# Patient Record
Sex: FEMALE | Race: WHITE | Employment: STUDENT | URBAN - METROPOLITAN AREA
[De-identification: names, ages, dates, MRNs, and addresses within clinical notes are randomized per-mention and may not be internally consistent; named-entity substitution may affect disease eponyms.]

---

## 2017-10-13 LAB
ABO + RH BLD: NORMAL
ABO + RH BLD: NORMAL
HBV SURFACE AG SERPL QL IA: NEGATIVE
HIV 1+2 AB+HIV1 P24 AG SERPL QL IA: NEGATIVE
RUBELLA ANTIBODY IGG QUANTITATIVE: NORMAL IU/ML
T PALLIDUM IGG SER QL: NEGATIVE

## 2017-11-13 ENCOUNTER — TRANSFERRED RECORDS (OUTPATIENT)
Dept: HEALTH INFORMATION MANAGEMENT | Facility: CLINIC | Age: 37
End: 2017-11-13

## 2017-12-21 ENCOUNTER — PRE VISIT (OUTPATIENT)
Dept: MATERNAL FETAL MEDICINE | Facility: CLINIC | Age: 37
End: 2017-12-21

## 2018-01-04 ENCOUNTER — OFFICE VISIT (OUTPATIENT)
Dept: MATERNAL FETAL MEDICINE | Facility: CLINIC | Age: 38
End: 2018-01-04
Attending: OBSTETRICS & GYNECOLOGY
Payer: COMMERCIAL

## 2018-01-04 ENCOUNTER — HOSPITAL ENCOUNTER (OUTPATIENT)
Dept: ULTRASOUND IMAGING | Facility: CLINIC | Age: 38
Discharge: HOME | End: 2018-01-04
Attending: OBSTETRICS & GYNECOLOGY | Admitting: OBSTETRICS & GYNECOLOGY
Payer: COMMERCIAL

## 2018-01-04 DIAGNOSIS — O26.90 PREGNANCY RELATED CONDITION, ANTEPARTUM: ICD-10-CM

## 2018-01-04 DIAGNOSIS — O09.522 ELDERLY MULTIGRAVIDA, CURRENTLY PREGNANT, SECOND TRIMESTER: Primary | ICD-10-CM

## 2018-01-04 DIAGNOSIS — O09.522 ELDERLY MULTIGRAVIDA IN SECOND TRIMESTER: Primary | ICD-10-CM

## 2018-01-04 PROCEDURE — 76811 OB US DETAILED SNGL FETUS: CPT

## 2018-01-04 PROCEDURE — 96040 ZZH GENETIC COUNSELING, EACH 30 MINUTES: CPT | Mod: ZF | Performed by: GENETIC COUNSELOR, MS

## 2018-01-04 NOTE — PROGRESS NOTES
[facility] Fetal [facility] [place]  Genetic Counseling Consult    Patient:  Nohemy Oconnor YOB: 1980   Date of Service:  18      Nohemy Oconnor was seen at the [facility] [place] for genetic consultation as part of her appointment for comprehensive ultrasound.  The indication for genetic counseling is advanced maternal age.       Impression/Plan:   1. Nohemy has declined serum screening in this pregnancy.    2. Nohemy had a comprehensive (level II) ultrasound today.  Please see the ultrasound report for further details.    3. The patient declines genetic amniocentesis and maternal serum screening today.    Pregnancy History:   /Parity:    Age at Delivery: 37 year old  GULSHAN: 2018, by Last Menstrual Period  Gestational Age: 20w1d    No significant complications or exposures were reported in the current pregnancy.    Nohemy cox pregnancy history is significant for a healthy son and daughter.    Medical History:   Nohemy has a history of a DVT while on OCPs.  She is taking Lovenox in this pregnancy.         Family History:   A three-generation pedigree was obtained, and is scanned under the  Media  tab.   The following significant findings were reported by Nohemy:    Nohemy has a personal and family history of DVT.  She had a DVT while on OCPs, her father had a DVT following surgery and her sister had a postpartum DVT.  She has met with a hematologist through [facility] and had a negative inherited thrombophilia work-up. We did discuss the potential option of further assessment through the Bleeding and Clotting clinic at the [facility].  At this time, Nohemy is not interested in this option.      Nohemy's , Blade, has a 13YO nephew through his brother with DiGeorge syndrome.  We discussed that DiGeorge syndrome is autosomal dominant and caused by a deletion of 22q11. Approximately 90% of  affected individuals have a de nicolasa or new deletion and 10% inherit a deletion from a parent.  They do not believe his parents have had genetic testing. Blade reports no additional family history of clinical features associated with DiGeorge including cardiac defects, clefting, learning disabilities, immune deficiency or hypocalcemia. They did indicate their nephew's mother (unrelated to Blade) was born with a hole in her heart.   I explained that features in affected individuals can be subtle and vary, even within the same family. WE discussed that the risk is likely low for their children to have DiGeorge syndrome, but without more definitive information in the family, it is not possible to completely eliminate risk.      Otherwise, the reported family history is negative for multiple miscarriages, stillbirths, birth defects, mental retardation, known genetic conditions, and consanguinity.       Carrier Screening:   The patient reports that she and the father of the pregnancy have  ancestry:      Cystic fibrosis is an autosomal recessive genetic condition that occurs with increased frequency in individuals of  ancestry and carrier screening for this condition is available.  In addition,  screening in the [facility] includes cystic fibrosis.      Expanded carrier screening for mutations in a large panel of genes associated with autosomal recessive conditions including cystic fibrosis, spinal muscular atrophy, and others, is now available.      The patient has declined the carrier screening options reviewed today.       Risk Assessment for Chromosome Conditions:   We explained that the risk for fetal chromosome abnormalities increases with maternal age. We discussed specific features of common chromosome abnormalities, including Down syndrome, trisomy 13, trisomy 18, and sex chromosome trisomies.      - At age 37 at midtrimester, the risk to have a baby with Down syndrome is 1 in 216.      - At age 37 at midtrimester, the risk to have a baby with any chromosome abnormality is 1 in 105.       Nohemy did not have maternal serum screening earlier in pregnancy.       Testing Options:   We discussed the following options:   Non-invasive Prenatal Testing (NIPT)    Maternal plasma cell-free DNA testing; first trimester ultrasound with nuchal translucency and nasal bone assessment is recommended, when appropriate    Screens for fetal trisomy 21, trisomy 13, trisomy 18, and sex chromosome aneuploidy    Cannot screen for open neural tube defects; maternal serum AFP after 15 weeks is recommended     Genetic Amniocentesis    Invasive procedure typically performed in the second trimester by which amniotic fluid is obtained for the purpose of chromosome analysis and/or other prenatal genetic analysis    Diagnostic results; >99% sensitivity for fetal chromosome abnormalities    AFAFP measurement tests for open neural tube defects     Comprehensive (Level II) ultrasound: Detailed ultrasound performed between 18-22 weeks gestation to screen for major birth defects and markers for aneuploidy.      We reviewed the benefits and limitations of this testing.  Screening tests provide a risk assessment specific to the pregnancy for certain fetal chromosome abnormalities, but cannot definitively diagnose or exclude a fetal chromosome abnormality.  Follow-up genetic counseling and consideration of diagnostic testing is recommended with any abnormal screening result.     Diagnostic tests carry inherent risks- including risk of miscarriage- that require careful consideration.  These tests can detect fetal chromosome abnormalities with greater than 99% certainty.  Results can be compromised by maternal cell contamination or mosaicism, and are limited by the resolution of cytogenetic G-banding technology.  There is no screening nor diagnostic test that can detect all forms of birth defects or mental disability.    It was a  pleasure to be involved with Nohemy s care. Face-to-face time of the meeting was 30 minutes.      Carley Locke, [facility]  Certified Genetic Counselor  VM: [phone]

## 2018-01-04 NOTE — MR AVS SNAPSHOT
"              After Visit Summary   2018    Nohemy Oconnor    MRN: [id number]           Patient Information     Date Of Birth          1980        Visit Information        Provider Department      2018 11:00 AM Carley Locke [facility] Maternal Fetal Medicine [facility]        Today's Diagnoses     Elderly multigravida, currently pregnant, second trimester    -  1    Pregnancy related condition, antepartum           Follow-ups after your visit        Who to contact     If you have questions or need follow up information about today's clinic visit or your schedule please contact [facility] MATERNAL FETAL MEDIC[facility] directly at [phone].  Normal or non-critical lab and imaging results will be communicated to you by [employer], letter or phone within 4 business days after the clinic has received the results. If you do not hear from us within 7 days, please contact the clinic through [employer] or phone. If you have a critical or abnormal lab result, we will notify you by phone as soon as possible.  Submit refill requests through Rezzcard or call your pharmacy and they will forward the refill request to us. Please allow 3 business days for your refill to be completed.          Additional Information About Your Visit        Ha[employer] Information     Rezzcard lets you send messages to your doctor, view your test results, renew your prescriptions, schedule appointments and more. To sign up, go to [email or web address] . Click on \"Log in\" on the left side of the screen, which will take you to the Welcome page. Then click on \"Sign up Now\" on the right side of the page.     You will be asked to enter the access code listed below, as well as some personal information. Please follow the directions to create your username and password.     Your access code is: [email or web address]  Expires: 2018  2:37 PM     Your access code will  in 90 days. If you need help or a new code, please call your " [facility] or [phone].        Care EveryWhere ID     This is your Care EveryWhere ID. This could be used by other organizations to access your [facility] medical records  [id number]        Your Vitals Were     Last Period                   08/16/2017            Blood Pressure from Last 3 Encounters:   01/08/14 118/74   01/01/14 122/80    Weight from Last 3 Encounters:   01/06/14 83.5 kg (184 lb)              We Performed the Following     [facility] Genetic Counseling        Primary Care Provider Office Phone # Fax #    Melani Patino [phone]       [facility] [place] [address]  [facility] [address]        Equal Access to Services     DIANA CASAS : Hadii zuly ku hadasho Soomaali, waaxda luqadaha, qaybta kaalmada adeegyada, hayder romo . So [facility] [phone].    ATENCIÓN: Si habla español, tiene a monahan disposición servicios gratuitos de asistencia lingüística. Llame al [phone].    We comply with applicable federal civil rights laws and [place] laws. We do not discriminate on the basis of race, color, national origin, age, disability, sex, sexual orientation, or gender identity.            Thank you!     Thank you for choosing [facility]  for your care. Our goal is always to provide you with excellent care. Hearing back from our patients is one way we can continue to improve our services. Please take a few minutes to complete the written survey that you may receive in the mail after your visit with us. Thank you!             Your Updated Medication List - Protect others around you: Learn how to safely use, store and throw away your medicines at www.disposemymeds.org.          This list is accurate as of: 1/4/18  2:37 PM.  Always use your most recent med list.                   Brand Name Dispense Instructions for use Diagnosis    acetaminophen 325 MG tablet    TYLENOL    100 tablet    Take 2 tablets (650 mg) by mouth  every 4 hours as needed (fever greater than 102?F)    Indication for care in labor or delivery       oxyCODONE IR 5 MG tablet    ROXICODONE    20 tablet    Take 1-2 tablets (5-10 mg) by mouth every 3 hours as needed    Indication for care in labor or delivery       prenatal multivitamin plus iron 27-0.8 MG Tabs per tablet      Take 1 tablet by mouth daily        senna-docusate 8.6-50 MG per tablet    SENOKOT-S;PERICOLACE    7 tablet    Take 1-2 tablets by mouth 2 times daily    Indication for care in labor or delivery

## 2018-01-04 NOTE — MR AVS SNAPSHOT
"              After Visit Summary   2018    Nohemy Oconnor    MRN: [id number]           Patient Information     Date Of Birth          1980        Visit Information        Provider Department      2018 12:15 PM Nicole Mays, DO [facility]        Today's Diagnoses     Elderly multigravida in second trimester    -  1       Follow-ups after your visit        Who to contact     If you have questions or need follow up information about today's clinic visit or your schedule please contact [facility] MATERNAL FETAL MEDICINE [facility] directly at [phone].  Normal or non-critical lab and imaging results will be communicated to you by emoquohart, letter or phone within 4 business days after the clinic has received the results. If you do not hear from us within 7 days, please contact the clinic through [employer] or phone. If you have a critical or abnormal lab result, we will notify you by phone as soon as possible.  Submit refill requests through [employer] or call your pharmacy and they will forward the refill request to us. Please allow 3 business days for your refill to be completed.          Additional Information About Your Visit        MyChart Information     [employer] lets you send messages to your doctor, view your test results, renew your prescriptions, schedule appointments and more. To sign up, go to [email or web address] . Click on \"Log in\" on the left side of the screen, which will take you to the Welcome page. Then click on \"Sign up Now\" on the right side of the page.     You will be asked to enter the access code listed below, as well as some personal information. Please follow the directions to create your username and password.     Your access code is: [email or web address]  Expires: 2018  2:37 PM     Your access code will  in 90 days. If you need help or a new code, please call your [place] clinic or [phone].        Care EveryWhere ID     This " is your Care EveryWhere ID. This could be used by other organizations to access your [place] medical records  [id number]        Your Vitals Were     Last Period                   08/16/2017            Blood Pressure from Last 3 Encounters:   01/08/14 118/74   01/01/14 122/80    Weight from Last 3 Encounters:   01/06/14 83.5 kg (184 lb)              Today, you had the following     No orders found for display       Primary Care Provider Office Phone # Fax #    Melani Patino [phone]       [facility] [place] [address]  [facility] [address]        Equal Access to Services     [facility]: Hadii aad ku hadasho Soomaali, waaxda luqadaha, qaybta kaalmada adeegyada, hayder garcia hayaan mikie romo . So [facility] [phone].    ATENCIÓN: Si habla español, tiene a monahan disposición servicios gratuitos de asistencia lingüística. Ll[facility] [phone].    We comply with applicable federal civil rights laws and [place] laws. We do not discriminate on the basis of race, color, national origin, age, disability, sex, sexual orientation, or gender identity.            Thank you!     Thank you for choosing [facility]  for your care. Our goal is always to provide you with excellent care. Hearing back from our patients is one way we can continue to improve our services. Please take a few minutes to complete the written survey that you may receive in the mail after your visit with us. Thank you!             Your Updated Medication List - Protect others around you: Learn how to safely use, store and throw away your medicines at www.disposemymeds.org.          This list is accurate as of: 1/4/18 11:59 PM.  Always use your most recent med list.                   Brand Name Dispense Instructions for use Diagnosis    acetaminophen 325 MG tablet    TYLENOL    100 tablet    Take 2 tablets (650 mg) by mouth every 4 hours as needed (fever greater than 102?F)     Indication for care in labor or delivery       oxyCODONE IR 5 MG tablet    ROXICODONE    20 tablet    Take 1-2 tablets (5-10 mg) by mouth every 3 hours as needed    Indication for care in labor or delivery       prenatal multivitamin plus iron 27-0.8 MG Tabs per tablet      Take 1 tablet by mouth daily        senna-docusate 8.6-50 MG per tablet    SENOKOT-S;PERICOLACE    7 tablet    Take 1-2 tablets by mouth 2 times daily    Indication for care in labor or delivery

## 2018-01-09 NOTE — PROGRESS NOTES
"Please see \"Imaging\" tab under \"Chart Review\" for details of today's US.    Nicole Mays, DO    "

## 2018-04-27 LAB — GROUP B STREP PCR: POSITIVE

## 2018-05-21 ENCOUNTER — HOSPITAL ENCOUNTER (INPATIENT)
Facility: CLINIC | Age: 38
LOS: 2 days | Discharge: HOME HEALTH | End: 2018-05-23
Attending: OBSTETRICS & GYNECOLOGY | Admitting: OBSTETRICS & GYNECOLOGY
Payer: COMMERCIAL

## 2018-05-21 PROCEDURE — 10907ZC DRAINAGE OF AMNIOTIC FLUID, THERAPEUTIC FROM PRODUCTS OF CONCEPTION, VIA NATURAL OR ARTIFICIAL OPENING: ICD-10-PCS | Performed by: OBSTETRICS & GYNECOLOGY

## 2018-05-21 PROCEDURE — 25000128 H RX IP 250 OP 636: Performed by: OBSTETRICS & GYNECOLOGY

## 2018-05-21 PROCEDURE — 25000128 H RX IP 250 OP 636

## 2018-05-21 PROCEDURE — 25000132 ZZH RX MED GY IP 250 OP 250 PS 637: Performed by: OBSTETRICS & GYNECOLOGY

## 2018-05-21 PROCEDURE — 12000037 ZZH R&B POSTPARTUM INTERMEDIATE

## 2018-05-21 PROCEDURE — 72200001 ZZH LABOR CARE VAGINAL DELIVERY SINGLE

## 2018-05-21 PROCEDURE — 25000125 ZZHC RX 250

## 2018-05-21 RX ORDER — HYDROCORTISONE 2.5 %
CREAM (GRAM) TOPICAL 3 TIMES DAILY PRN
Status: DISCONTINUED | OUTPATIENT
Start: 2018-05-21 | End: 2018-05-23 | Stop reason: HOSPADM

## 2018-05-21 RX ORDER — SODIUM CHLORIDE, SODIUM LACTATE, POTASSIUM CHLORIDE, CALCIUM CHLORIDE 600; 310; 30; 20 MG/100ML; MG/100ML; MG/100ML; MG/100ML
INJECTION, SOLUTION INTRAVENOUS CONTINUOUS
Status: DISCONTINUED | OUTPATIENT
Start: 2018-05-21 | End: 2018-05-21

## 2018-05-21 RX ORDER — ONDANSETRON 2 MG/ML
4 INJECTION INTRAMUSCULAR; INTRAVENOUS EVERY 6 HOURS PRN
Status: DISCONTINUED | OUTPATIENT
Start: 2018-05-21 | End: 2018-05-21

## 2018-05-21 RX ORDER — OXYCODONE AND ACETAMINOPHEN 5; 325 MG/1; MG/1
1 TABLET ORAL
Status: DISCONTINUED | OUTPATIENT
Start: 2018-05-21 | End: 2018-05-21

## 2018-05-21 RX ORDER — MISOPROSTOL 200 UG/1
400 TABLET ORAL
Status: DISCONTINUED | OUTPATIENT
Start: 2018-05-21 | End: 2018-05-23 | Stop reason: HOSPADM

## 2018-05-21 RX ORDER — IBUPROFEN 400 MG/1
800 TABLET, FILM COATED ORAL
Status: DISCONTINUED | OUTPATIENT
Start: 2018-05-21 | End: 2018-05-21

## 2018-05-21 RX ORDER — LIDOCAINE HYDROCHLORIDE 10 MG/ML
INJECTION, SOLUTION INFILTRATION; PERINEURAL
Status: COMPLETED
Start: 2018-05-21 | End: 2018-05-21

## 2018-05-21 RX ORDER — BISACODYL 10 MG
10 SUPPOSITORY, RECTAL RECTAL DAILY PRN
Status: DISCONTINUED | OUTPATIENT
Start: 2018-05-23 | End: 2018-05-23 | Stop reason: HOSPADM

## 2018-05-21 RX ORDER — NALOXONE HYDROCHLORIDE 0.4 MG/ML
.1-.4 INJECTION, SOLUTION INTRAMUSCULAR; INTRAVENOUS; SUBCUTANEOUS
Status: DISCONTINUED | OUTPATIENT
Start: 2018-05-21 | End: 2018-05-21

## 2018-05-21 RX ORDER — IBUPROFEN 400 MG/1
800 TABLET, FILM COATED ORAL EVERY 6 HOURS PRN
Status: DISCONTINUED | OUTPATIENT
Start: 2018-05-21 | End: 2018-05-21

## 2018-05-21 RX ORDER — OXYTOCIN/0.9 % SODIUM CHLORIDE 30/500 ML
100-340 PLASTIC BAG, INJECTION (ML) INTRAVENOUS CONTINUOUS PRN
Status: DISCONTINUED | OUTPATIENT
Start: 2018-05-21 | End: 2018-05-21

## 2018-05-21 RX ORDER — ACETAMINOPHEN 325 MG/1
650 TABLET ORAL EVERY 4 HOURS PRN
Status: DISCONTINUED | OUTPATIENT
Start: 2018-05-21 | End: 2018-05-23 | Stop reason: HOSPADM

## 2018-05-21 RX ORDER — LANOLIN 100 %
OINTMENT (GRAM) TOPICAL
Status: DISCONTINUED | OUTPATIENT
Start: 2018-05-21 | End: 2018-05-23 | Stop reason: HOSPADM

## 2018-05-21 RX ORDER — OXYTOCIN 10 [USP'U]/ML
10 INJECTION, SOLUTION INTRAMUSCULAR; INTRAVENOUS
Status: DISCONTINUED | OUTPATIENT
Start: 2018-05-21 | End: 2018-05-21

## 2018-05-21 RX ORDER — HEPARIN SODIUM (PORCINE) LOCK FLUSH IV SOLN 100 UNIT/ML 100 UNIT/ML
SOLUTION INTRAVENOUS EVERY 12 HOURS
Status: ON HOLD | COMMUNITY
End: 2018-05-22

## 2018-05-21 RX ORDER — METHYLERGONOVINE MALEATE 0.2 MG/ML
200 INJECTION INTRAVENOUS
Status: DISCONTINUED | OUTPATIENT
Start: 2018-05-21 | End: 2018-05-21

## 2018-05-21 RX ORDER — AMOXICILLIN 250 MG
2 CAPSULE ORAL 2 TIMES DAILY
Status: DISCONTINUED | OUTPATIENT
Start: 2018-05-21 | End: 2018-05-23 | Stop reason: HOSPADM

## 2018-05-21 RX ORDER — OXYTOCIN 10 [USP'U]/ML
INJECTION, SOLUTION INTRAMUSCULAR; INTRAVENOUS
Status: COMPLETED
Start: 2018-05-21 | End: 2018-05-21

## 2018-05-21 RX ORDER — OXYTOCIN/0.9 % SODIUM CHLORIDE 30/500 ML
100 PLASTIC BAG, INJECTION (ML) INTRAVENOUS CONTINUOUS
Status: DISCONTINUED | OUTPATIENT
Start: 2018-05-21 | End: 2018-05-23 | Stop reason: HOSPADM

## 2018-05-21 RX ORDER — ACETAMINOPHEN 325 MG/1
650 TABLET ORAL EVERY 4 HOURS PRN
Status: DISCONTINUED | OUTPATIENT
Start: 2018-05-21 | End: 2018-05-21

## 2018-05-21 RX ORDER — CARBOPROST TROMETHAMINE 250 UG/ML
250 INJECTION, SOLUTION INTRAMUSCULAR
Status: DISCONTINUED | OUTPATIENT
Start: 2018-05-21 | End: 2018-05-22

## 2018-05-21 RX ORDER — OXYTOCIN 10 [USP'U]/ML
10 INJECTION, SOLUTION INTRAMUSCULAR; INTRAVENOUS
Status: DISCONTINUED | OUTPATIENT
Start: 2018-05-21 | End: 2018-05-23 | Stop reason: HOSPADM

## 2018-05-21 RX ORDER — AMOXICILLIN 250 MG
1 CAPSULE ORAL 2 TIMES DAILY
Status: DISCONTINUED | OUTPATIENT
Start: 2018-05-21 | End: 2018-05-23 | Stop reason: HOSPADM

## 2018-05-21 RX ORDER — OXYTOCIN/0.9 % SODIUM CHLORIDE 30/500 ML
340 PLASTIC BAG, INJECTION (ML) INTRAVENOUS CONTINUOUS PRN
Status: DISCONTINUED | OUTPATIENT
Start: 2018-05-21 | End: 2018-05-23 | Stop reason: HOSPADM

## 2018-05-21 RX ORDER — NALOXONE HYDROCHLORIDE 0.4 MG/ML
.1-.4 INJECTION, SOLUTION INTRAMUSCULAR; INTRAVENOUS; SUBCUTANEOUS
Status: DISCONTINUED | OUTPATIENT
Start: 2018-05-21 | End: 2018-05-23 | Stop reason: HOSPADM

## 2018-05-21 RX ADMIN — ENOXAPARIN SODIUM 30 MG: 30 INJECTION SUBCUTANEOUS at 18:00

## 2018-05-21 RX ADMIN — ACETAMINOPHEN 650 MG: 325 TABLET ORAL at 21:50

## 2018-05-21 RX ADMIN — IBUPROFEN 800 MG: 400 TABLET ORAL at 06:48

## 2018-05-21 RX ADMIN — LIDOCAINE HYDROCHLORIDE 20 ML: 10 INJECTION, SOLUTION INFILTRATION; PERINEURAL at 06:11

## 2018-05-21 RX ADMIN — SENNOSIDES AND DOCUSATE SODIUM 1 TABLET: 8.6; 5 TABLET ORAL at 21:50

## 2018-05-21 RX ADMIN — SENNOSIDES AND DOCUSATE SODIUM 1 TABLET: 8.6; 5 TABLET ORAL at 11:37

## 2018-05-21 RX ADMIN — ACETAMINOPHEN 650 MG: 325 TABLET ORAL at 06:47

## 2018-05-21 RX ADMIN — ACETAMINOPHEN 650 MG: 325 TABLET ORAL at 12:55

## 2018-05-21 RX ADMIN — OXYTOCIN 10 UNITS: 10 INJECTION, SOLUTION INTRAMUSCULAR; INTRAVENOUS at 06:16

## 2018-05-21 RX ADMIN — ACETAMINOPHEN 650 MG: 325 TABLET ORAL at 16:42

## 2018-05-21 NOTE — IP AVS SNAPSHOT
[address]    [place] [address]    Phone:  [phone]                                       After Visit Summary   5/21/2018    Nohemy Oconnor    MRN: [id number]           After Visit Summary Signature Page     I have received my discharge instructions, and my questions have been answered. I have discussed any challenges I see with this plan with the nurse or doctor.    ..........................................................................................................................................  Patient/Patient Representative Signature      ..........................................................................................................................................  Patient Representative Print Name and Relationship to Patient    ..................................................               ................................................  Date                                            Time    ..........................................................................................................................................  Reviewed by Signature/Title    ...................................................              ..............................................  Date                                                            Time

## 2018-05-21 NOTE — PLAN OF CARE
At 0815 Pt. admitted from L&D  via whee lchair and transferred to bed with assist. Pt. arrived with baby and was accompanied by &RN and arrived with personal belongings. Report was taken from Kellie Sommer RN in L&D. VSS. Fundus is firm and midline.  Vaginal bleeding is small.  Pt. oriented to the room and call light system.

## 2018-05-21 NOTE — PLAN OF CARE
Pt arrived to MAC for rule out labor with urge to push. Pt brought directly to room 232. Cervical exam at 0600, pt complete. In house OB called to room. Dr Jackson notified of status and on his way. Anila MOSELEY, assuming care of pt.

## 2018-05-21 NOTE — PROGRESS NOTES
Patient has a history of DVT, and was on heparin just prior to delivery. We will switch to prophylactic lovenox dosing.

## 2018-05-21 NOTE — PROVIDER NOTIFICATION
Received order to change lovenox dose from 40mg q12h to 30mg q12h for prophylatic dosing.  Also received order to change Codeine order to Tylenol #3 order due to nonformulary status.

## 2018-05-21 NOTE — IP AVS SNAPSHOT
MRN:[id number]                      After Visit Summary   5/21/2018    Nohemy Oconnor    MRN: [id number]           Thank you!     Thank you for choosing [facility] for your care. Our goal is always to provide you with excellent care. Hearing back from our patients is one way we can continue to improve our services. Please take a few minutes to complete the written survey that you may receive in the mail after you visit with us. Thank you!        Patient Information     Date Of Birth          1980        Designated Caregiver       Most Recent Value    Caregiver    Name of designated caregiver Blade    Phone number of caregiver [phone]      About your hospital stay     You were admitted on:  May 21, 2018 You last received care in the:  [address]    You were discharged on:  May 23, 2018       Who to Call     For medical emergencies, please call 911.  For non-urgent questions about your medical care, please call your primary care provider or clinic, [phone]          Attending Provider     Provider Specialty    Berhane Jackson MD OB/Gyn    Anali Dodd MD OB/Gyn       Primary Care Provider Office Phone # Fax #    Melani Patino [phone]      Further instructions from your care team       Postpartum Vaginal Delivery Instructions    Activity       Ask family and friends for help when you need it.    Do not place anything in your vagina for 6 weeks.    You are not restricted on other activities, but take it easy for a few weeks to allow your body to recover from delivery.  You are able to do any activities you feel up to that point.    No driving until you have stopped taking your pain medications (usually two weeks after delivery).     Call your health care provider if you have any of these symptoms:       Increased pain, swelling, redness, or fluid around your stiches from an episiotomy or perineal tear.    A fever above 100.4 F  (38 C) with or without chills when placing a thermometer under your tongue.    You soak a sanitary pad with blood within 1 hour, or you see blood clots larger than a golf ball.    Bleeding that lasts more than 6 weeks.    Vaginal discharge that smells bad.    Severe pain, cramping or tenderness in your lower belly area.    A need to urinate more frequently (use the toilet more often), more urgently (use the toilet very quickly), or it burns when you urinate.    Nausea and vomiting.    Redness, swelling or pain around a vein in your leg.    Problems breastfeeding or a red or painful area on your breast.    Chest pain and cough or are gasping for air.    Problems coping with sadness, anxiety, or depression.  If you have any concerns about hurting yourself or the baby, call your provider immediately.     You have questions or concerns after you return home.     Keep your hands clean:  Always wash your hands before touching your perineal area and stitches.  This helps reduce your risk of infection.  If your hands aren't dirty, you may use an alcohol hand-rub to clean your hands. Keep your nails clean and short.        Pending Results     No orders found from 5/19/2018 to 5/22/2018.            Statement of Approval     Ordered          05/23/18 0755  I have reviewed and agree with all the recommendations and orders detailed in this document.  EFFECTIVE NOW     Approved and electronically signed by:  Anali Dodd MD           05/22/18 9053  I have reviewed and agree with all the recommendations and orders detailed in this document.  EFFECTIVE NOW     Approved and electronically signed by:  Anali Dodd MD             Admission Information     Date & Time Provider Department Dept. Phone    5/21/2018 Anali Dodd MD [address] [phone]      Your Vitals Were     Blood Pressure Pulse Temperature Respirations Height Weight    104/59 57 97.7  F (36.5  C) (Oral) 16 1.803 m  "(5' 11\") 77.6 kg (171 lb)    Last Period BMI (Body Mass Index)                2017 23.85 kg/m2          Sur[employer]ying And Mapping (SAM) Information     Sur[employer] Mapping (SAM) lets you send messages to your doctor, view your test results, renew your prescriptions, schedule appointments and more. To sign up, go to [email or web address] . Click on \"Log in\" on the left side of the screen, which will take you to the Welcome page. Then click on \"Sign up Now\" on the right side of the page.     You will be asked to enter the access code listed below, as well as some personal information. Please follow the directions to create your username and password.     Your access code is: [email or web address]  Expires: 2018 10:39 AM     Your access code will  in 90 days. If you need help or a new code, please call your [facility] clinic or [phone].        Care EveryWhere ID     This is your Care EveryWhere ID. This could be used by other organizations to access your [facility] medical records  [id number]        Equal Access to Services     [facility] : Hadedwina Hung, keven thomas, chema andrade, hayder john. So [facility] [phone].    ATENCIÓN: Si habla español, tiene a monahan disposición servicios gratuitos de asistencia lingüística. Delma al [phone].    We comply with applicable federal civil rights laws and [place] laws. We do not discriminate on the basis of race, color, national origin, age, disability, sex, sexual orientation, or gender identity.               Review of your medicines      START taking        Dose / Directions    enoxaparin 40 MG/0.4ML injection   Commonly known as:  LOVENOX        Dose:  40 mg   Inject 0.4 mLs (40 mg) Subcutaneous daily   Quantity:  30 Syringe   Refills:  0       senna-docusate 8.6-50 MG per tablet   Commonly known as:  SENOKOT-S;PERICOLACE        Dose:  1 tablet   Take 1 tablet by mouth 2 times daily   Quantity:  100 tablet   Refills:  0         CONTINUE " these medicines which have NOT CHANGED        Dose / Directions    acetaminophen 325 MG tablet   Commonly known as:  TYLENOL   Used for:  Indication for care in labor or delivery        Dose:  650 mg   Take 2 tablets (650 mg) by mouth every 4 hours as needed (fever greater than 102?F)   Quantity:  100 tablet   Refills:  0       prenatal multivitamin plus iron 27-0.8 MG Tabs per tablet   Indication:  Pregnancy        Dose:  1 tablet   Take 1 tablet by mouth daily   Refills:  0         STOP taking     heparin 100 UNIT/ML Soln injection                Where to get your medicines      Some of these will need a paper prescription and others can be bought over the counter. Ask your nurse if you have questions.     You don't need a prescription for these medications     enoxaparin 40 MG/0.4ML injection    senna-docusate 8.6-50 MG per tablet                Protect others around you: Learn how to safely use, store and throw away your medicines at www.disposemymeds.org.             Medication List: This is a list of all your medications and when to take them. Check marks below indicate your daily home schedule. Keep this list as a reference.      Medications           Morning Afternoon Evening Bedtime As Needed    acetaminophen 325 MG tablet   Commonly known as:  TYLENOL   Take 2 tablets (650 mg) by mouth every 4 hours as needed (fever greater than 102?F)   Last time this was given:  650 mg on 5/23/2018  8:48 AM                                enoxaparin 40 MG/0.4ML injection   Commonly known as:  LOVENOX   Inject 0.4 mLs (40 mg) Subcutaneous daily   Last time this was given:  30 mg on 5/23/2018  6:19 AM                                prenatal multivitamin plus iron 27-0.8 MG Tabs per tablet   Take 1 tablet by mouth daily                                senna-docusate 8.6-50 MG per tablet   Commonly known as:  SENOKOT-S;PERICOLACE   Take 1 tablet by mouth 2 times daily   Last time this was given:  2 tablets on 5/22/2018  7:01  PM

## 2018-05-21 NOTE — PLAN OF CARE
Problem: Patient Care Overview  Goal: Plan of Care/Patient Progress Review  Outcome: Improving  Vital signs stable,voiding with out difficulty,pain control with tylenol,using aqua k pad for uterine cramps,baby breast feeding well.will start lovenox this evening due to history of DVT. Will continue to monitor.

## 2018-05-21 NOTE — H&P
[facility] Labor and Delivery History and Physical    Nohemy Oconnor MRN# [id number]   Age: 37 year old YOB: 1980     Date of Admission:  2018    Primary care provider: Melani Patino           Chief Complaint:   Nohemy Oconnor is a 37 year old female who is 39w5d pregnant and being admitted for labor management.          Pregnancy history:     OBSTETRIC HISTORY:    Obstetric History       T2      L2     SAB0   TAB0   Ectopic0   Multiple0   Live Births2       # Outcome Date GA Lbr Misael/2nd Weight Sex Delivery Anes PTL Lv   3 Current            2 Term 14 40w4d 05:45 / 00:18 3.55 kg (7 lb 13.2 oz) M Vag-Spont EPI  FIFI      Name: JAY,AYANA VAZQUEZ      Apgar1:  9                Apgar5: 9   1 Term 07 40w0d   F Vag-Spont EPI N FIFI          EDC: Estimated Date of Delivery: May 23, 2018    Prenatal Labs:   Lab Results   Component Value Date    ABO B 10/13/2017    RH Pos 10/13/2017    AS Neg 2014    HEPBANG negative 10/13/2017    TREPAB negative 10/13/2017    RUBELLAABIGG 72- immune 2013    HGB 11.9 2014    HIV non reactive 2013       GBS Status:   Lab Results   Component Value Date    GBS positive 2018       Active Problem List  Patient Active Problem List   Diagnosis     Pregnancy related condition     Indication for care in labor or delivery       Medication Prior to Admission  Prescriptions Prior to Admission   Medication Sig Dispense Refill Last Dose     acetaminophen (TYLENOL) 325 MG tablet Take 2 tablets (650 mg) by mouth every 4 hours as needed (fever greater than 102 F) 100 tablet       heparin 100 UNIT/ML SOLN injection Inject Subcutaneous every 12 hours        Prenatal Vit-Fe Fumarate-FA (PRENATAL MULTIVITAMIN  PLUS IRON) 27-0.8 MG TABS Take 1 tablet by mouth daily   2014 at Unknown   .        Maternal Past Medical History:     Past Medical History:   Diagnosis Date     DVT (deep venous thrombosis) (H)  2009     Postpartum depression                        Family History:   No family history on file.  Family history reviewed and updated in [facility]            Social History:     Social History   Substance Use Topics     Smoking status: Never Smoker     Smokeless tobacco: Never Used     Alcohol use No            Review of Systems:   C: NEGATIVE for fever, chills, change in weight  E/M: NEGATIVE for ear, mouth and throat problems  R: NEGATIVE for significant cough or SOB  CV: NEGATIVE for chest pain, palpitations or peripheral edema          Physical Exam:   Vitals were reviewed    Constitutional: Awake, alert, cooperative, no apparent distress, and appears stated age.  Eyes: Lids and lashes normal, pupils equal, round and reactive to light, extra ocular muscles intact, sclera clear, conjunctiva normal.  ENT: Normocephalic, without obvious abnormality, atramatic, sinuses nontender on palpation, external ears without lesions, oral pharynx with moist mucus membranes, tonsils without erythema or exudates, gums normal and good dentition.  Neck: Supple, symmetrical, trachea midline, no adenopathy, thyroid symmetric, not enlarged and no tenderness, skin normal.  Hematologic / Lymphatic: No cervical lymphadenopathy and no supraclavicular lymphadenopathy.  Back: Symmetric, no curvature, spinous processes are non-tender on palpation, paraspinous muscles are non-tender on palpation, no costal vertebral tenderness.  Lungs: No increased work of breathing, good air exchange, clear to auscultation bilaterally, no crackles or wheezing.  Cardiovascular: Regular rate and rhythm, normal S1 and S2, no S3 or S4, and no murmur noted.  Chest / Breast: Breasts symmetrical, skin without lesion(s), no nipple retraction or dimpling, no nipple discharge, no masses palpated, no axillary or supraclavicular adenopathy.  Abdomen: No scars, normal bowel sounds, soft, non-distended, non-tender, no masses palpated, no hepatosplenomegally.  Genitourinary:  No urethral discharge, normal external genitalia, no hernia.  Musculoskeletal: No redness, warmth, or swelling of the joints.  Full range of motion noted.  Motor strength is 5 out of 5 all extremities bilaterally.  Tone is normal.  Neurologic: Awake, alert, oriented to name, place and time.  Cranial nerves II-XII are grossly intact.  Motor is 5 out of 5 bilaterally.  Cerebellar finger to nose, heel to shin intact.  Sensory is intact.  Babinski down going, Romberg negative, and gait is normal.  Neuropsychiatric: Normal affect, mood, orientation, memory and insight.  Skin: No rashes, erythema, pallor, petechia or purpura.     Cervix:   Membranes: SROM   Dilation: 10   Effacement: 100%   Station:+2    Presentation:Cephalic  Fetal Heart Rate Tracing: reactive and reassuring  Tocometer: external monitor                       Assessment:   Nohemy Oconnor is a 39w5d pregnant female admitted with labor management and SROM.          Plan:   She delivered precipitously. No obvious signs of pathologic cause.  Admit - see IP orders    Berhane Jackson MD

## 2018-05-21 NOTE — PROGRESS NOTES
OB In House  Called to room emergently secondary to patient in active labor, Complete, feeling urge to push.   See delivery note.    Dede Pedroza MD

## 2018-05-21 NOTE — L&D DELIVERY NOTE
Delivery Summary    Nohemy Oconnor MRN# [id number]   Age: 37 year old YOB: 1980     ASSESSMENT & PLAN: 39 5/7 week IUP, delivered.        Labor Event Times    Labor onset date:  18 Onset time:   3:00 AM   Dilation complete date:  18 Complete time:   6:03 AM   Start pushing date/time:  2018 0607            Labor Events     labor?:  No    steroids:  None   Labor Type:  Spontaneous   Predominate monitoring during 1st stage:  continuous electronic fetal monitoring      Antibiotics received during labor?:  No      Rupture date/time: 18 06   Rupture type:  Artificial Rupture of Membranes   Fluid color:  Clear   Fluid odor:  Normal      1:1 continuous labor support provided by?:  RN Labor partogram used?:  no         Delivery/Placenta Date and Time    Delivery Date:  18 Delivery Time:   6:10 AM   Placenta Date/Time:  2018  6:15 AM   Oxytocin given at the time of delivery:  after delivery of placenta      Vaginal Counts    Initial count performed by 2 team members:   Two Team Members   SHAHZAD Pedroza          Needles Suture [place] Sponges Instruments   Initial counts 2 0 5    Added to count 0 0 0    Final counts 2 0 5       Placed during labor Accounted for at the end of labor   No NA   No NA   No NA      Final count performed by 2 team members:   Two Team Members   SHAHZAD Pedroza         Final count correct?:  Yes         Apgars    Living status:  Living    1 Minute 5 Minute 10 Minute 15 Minute 20 Minute   Skin color: 1  1       Heart rate: 2  2       Reflex irritability: 2  2       Muscle tone: 2  2       Respiratory effort: 2  2       Total: 9  9          Apgars assigned by:  CHASE      Cord    Vessels:  3 Vessels Complications:  None   Cord Blood Disposition:  Lab Gases Sent?:  No         [place] Resuscitation    Methods:  None      Output in Delivery Room:  Voided      Skin to Skin and Feeding Plan    Skin to skin initiation  date/time: 18 0610   Skin to skin with:  Mother   Skin to skin end date/time:     Breastfeeding initiated date/time:  2018 0625   How do you plan to feed your baby:  Breastfeeding      Labor Events and Shoulder Dystocia    Fetal Tracing Prior to Delivery:  Category 1   Shoulder dystocia present?:  Neg            Delivery (Maternal) (Provider to Complete) (125152)    Episiotomy:  None   Perineal lacerations:  None    Vaginal laceration?:  No    Cervical laceration?:  No    Est. blood loss (mL):  300         Mother's Information  Mother: Nohemy Oconnor Judy #[id number]    Start of Mother's Information     IO Blood Loss  18 0300 - 18 0649    Mom's I/O Activity            End of Mother's Information  Mother: Nohemy Oconnor Judy #[id number]            Delivery - Provider to Complete (200396)    Delivering clinician:  MALICK GARCIA   Attempted Delivery Types (Choose all that apply):  Spontaneous Vaginal Delivery   Delivery Type (Choose the 1 that will go to the Birth History):  Vaginal, Spontaneous Delivery                           Placenta    Delayed Cord Clamping:  Done   Date/Time:  2018  6:15 AM   Removal:  Spontaneous   Disposition:  Hospital disposal      Anesthesia    Method:  None         Presentation and Position    Presentation:  Vertex    Occiput Anterior                    Patient is 38 yo  @ 39 5/7 weeks presented in active labor, Completely dilated, feeling urge to push. Precipitous delivery attended for Dr. Jackson who was en route. AROM performed just before maternal pushing efforts. Clear fluid. Patient with good pushing efforts,  over one contraction. Infant delivered over intact perineum. No nuchal cords noted. Remainder of infant delivered and placed on patient's abdomen. Delayed cord clamping performed. Placenta delivered spontaneously. Evaluation of placenta demonstrated no lacerations, no repair needed. Fundus firm after delivery. Both mother and baby stable  after delivery. Of note, patient on Heparin for history of blood clots. Last dose midnight. She will be started on Lovenox postpartum per plan.     Dede Pedroza MD, MD on 5/21/2018 at 6:55 AM    Dede Pedroza MD, MD

## 2018-05-22 LAB — PLATELET # BLD AUTO: 162 10E9/L (ref 150–450)

## 2018-05-22 PROCEDURE — 85049 AUTOMATED PLATELET COUNT: CPT | Performed by: OBSTETRICS & GYNECOLOGY

## 2018-05-22 PROCEDURE — 12000037 ZZH R&B POSTPARTUM INTERMEDIATE

## 2018-05-22 PROCEDURE — 25000128 H RX IP 250 OP 636: Performed by: OBSTETRICS & GYNECOLOGY

## 2018-05-22 PROCEDURE — 25000132 ZZH RX MED GY IP 250 OP 250 PS 637: Performed by: OBSTETRICS & GYNECOLOGY

## 2018-05-22 PROCEDURE — 36415 COLL VENOUS BLD VENIPUNCTURE: CPT | Performed by: OBSTETRICS & GYNECOLOGY

## 2018-05-22 RX ORDER — AMOXICILLIN 250 MG
1 CAPSULE ORAL 2 TIMES DAILY
Qty: 100 TABLET | COMMUNITY
Start: 2018-05-22

## 2018-05-22 RX ADMIN — SENNOSIDES AND DOCUSATE SODIUM 1 TABLET: 8.6; 5 TABLET ORAL at 07:46

## 2018-05-22 RX ADMIN — ACETAMINOPHEN 650 MG: 325 TABLET ORAL at 19:01

## 2018-05-22 RX ADMIN — SENNOSIDES AND DOCUSATE SODIUM 2 TABLET: 8.6; 5 TABLET ORAL at 19:01

## 2018-05-22 RX ADMIN — ENOXAPARIN SODIUM 30 MG: 30 INJECTION SUBCUTANEOUS at 18:04

## 2018-05-22 RX ADMIN — ACETAMINOPHEN 650 MG: 325 TABLET ORAL at 06:12

## 2018-05-22 RX ADMIN — ACETAMINOPHEN 650 MG: 325 TABLET ORAL at 10:39

## 2018-05-22 RX ADMIN — ACETAMINOPHEN 650 MG: 325 TABLET ORAL at 02:40

## 2018-05-22 RX ADMIN — ACETAMINOPHEN 650 MG: 325 TABLET ORAL at 22:45

## 2018-05-22 RX ADMIN — ENOXAPARIN SODIUM 30 MG: 30 INJECTION SUBCUTANEOUS at 06:12

## 2018-05-22 RX ADMIN — ACETAMINOPHEN 650 MG: 325 TABLET ORAL at 15:06

## 2018-05-22 NOTE — PROGRESS NOTES
[facility]       DAILY NOTE - POSTPARTUM DAY 1     SUBJECTIVE:     Pain controlled? Yes  Tolerating a regular diet? YES  Ambulating? YES  Voiding without difficulty? Yes    OBJECTIVE:  Vitals:    18 0240 18 0340 18 0612 18 0749   BP: 125/62   104/63   BP Location:       Pulse: 64   56   Resp: 16 16 16 16   Temp: 98.1  F (36.7  C)   97.9  F (36.6  C)   TempSrc: Oral   Oral   Weight:       Height:           Constitutional: healthy, alert and no distress    Abdomen:  Uterine fundus is firm, non-tender and at the level of the umbilicus     extr neg      LABS:  Hemoglobin   Date Value Ref Range Status   2014 11.9 11.7 - 15.7 g/dL Final   2014 13.9 11.7 - 15.7 g/dL Final       ASSESSMENT:  Post-partum day #1 s/p   Pregnancy complicated by on anticoagulation for prev DVT    Doing well.       PLAN:   Continue routine postpartum cares    Anali Dodd

## 2018-05-22 NOTE — PLAN OF CARE
Problem: Patient Care Overview  Goal: Plan of Care/Patient Progress Review  Outcome: No Change  VSS. Tylenol for uterine cramps providing adequate pain control. Declines narcotics. Eric Reg diet. Up independently. Voiding.   Lovenox SQ started tonight, pt independent with injections. Breast feeds going well.

## 2018-05-22 NOTE — PLAN OF CARE
At 1600, Dr. Larson called back regarding Pt's post  depression score 19.  Updated to MD that  referral has made.  No new orders at this time.  Continues to monitor Pt.

## 2018-05-22 NOTE — PLAN OF CARE
Problem: Postpartum (Vaginal Delivery) (Adult,Obstetrics,Pediatric)  Goal: Signs and Symptoms of Listed Potential Problems Will be Absent, Minimized or Managed (Postpartum)  Signs and symptoms of listed potential problems will be absent, minimized or managed by discharge/transition of care (reference Postpartum (Vaginal Delivery) (Adult,Obstetrics,Pediatric) CPG).   Outcome: Improving  VSS.  Pain well controlled with tylenol, requesting prn pain medications as needed.  Up independently in room.  Working on breastfeeding  and  cares. On pathway. Continue to monitor and notify MD as needed.

## 2018-05-22 NOTE — PROGRESS NOTES
MARGIE: Consult for score of 19 on EPDS. MARGIE discussed with charge RN, will plan to see in AM however also requested that provider be updated to discuss high score. Noted in prenatal records that pt has history of PPD.    MASON Cárdenas, Dorothe[facility] Dix Psychiatric CenterSW  Daytime (8:00am-4:30pm): [phone]  After-Hours MARGIE Pager (4:30pm-11:30pm): [phone]

## 2018-05-22 NOTE — PLAN OF CARE
Problem: Patient Care Overview  Goal: Plan of Care/Patient Progress Review  Outcome: Improving  Doing well,voiding with out difficulty,pain control with tylenol,declined need for narcotics,using aqua k pad for cramping,baby breast feeding well,using lanolin for sore nipple.post  depression score 19, consult ordered,notified  via vtext page.Will continue to monitor.

## 2018-05-23 VITALS
SYSTOLIC BLOOD PRESSURE: 104 MMHG | DIASTOLIC BLOOD PRESSURE: 59 MMHG | TEMPERATURE: 97.7 F | HEART RATE: 57 BPM | RESPIRATION RATE: 16 BRPM | HEIGHT: 71 IN | WEIGHT: 171 LBS | BODY MASS INDEX: 23.94 KG/M2

## 2018-05-23 PROCEDURE — 25000128 H RX IP 250 OP 636: Performed by: OBSTETRICS & GYNECOLOGY

## 2018-05-23 PROCEDURE — 25000132 ZZH RX MED GY IP 250 OP 250 PS 637: Performed by: OBSTETRICS & GYNECOLOGY

## 2018-05-23 RX ADMIN — ACETAMINOPHEN 650 MG: 325 TABLET ORAL at 08:48

## 2018-05-23 RX ADMIN — ACETAMINOPHEN 650 MG: 325 TABLET ORAL at 04:11

## 2018-05-23 RX ADMIN — ENOXAPARIN SODIUM 30 MG: 30 INJECTION SUBCUTANEOUS at 06:19

## 2018-05-23 NOTE — PLAN OF CARE
Problem: Patient Care Overview  Goal: Plan of Care/Patient Progress Review  Outcome: Improving  Pain is under control with tylenol.  Pt is comfortable with baby cares.  Bonding well with baby.  Pt up independently in room.  Continues to monitor Pt.

## 2018-05-23 NOTE — PLAN OF CARE
Problem: Patient Care Overview  Goal: Plan of Care/Patient Progress Review  Outcome: Improving  VSS, breastfeeding well with minimal assistance, pain well controlled with Tylenol and heat pack, interacting and bonding well with infant, encouraged to call with questions or concerns, will continue to monitor.

## 2018-05-23 NOTE — PROGRESS NOTES
[facility]       DAILY NOTE - POSTPARTUM DAY 2     SUBJECTIVE:     Pain controlled? Yes  Tolerating a regular diet? YES  Ambulating? YES  Voiding without difficulty? Yes    OBJECTIVE:  Vitals:    18 1901 18 2245 18 0041   BP:    109/66   BP Location:       Pulse:    56   Resp: 16 16 16 16   Temp:    98.4  F (36.9  C)   TempSrc:    Oral   Weight:       Height:           Constitutional: healthy, alert and no distress    Abdomen:  Uterine fundus is firm, non-tender and at the level of the umbilicus     extr neg    LABS:  Hemoglobin   Date Value Ref Range Status   2014 11.9 11.7 - 15.7 g/dL Final   2014 13.9 11.7 - 15.7 g/dL Final       ASSESSMENT:  Post-partum day #2 s/p   Pregnancy complicated by prev DVT         PLAN:   Discharge today.  Return to clinic in 6 weeks.   Continue routine postpartum cares    Anali Dodd

## 2018-05-23 NOTE — PROGRESS NOTES
"SWS Progress Note    Data: SW consult for postpartum assessment due to score of 19 on the EPDS. Pt is Sheree, a 36yo who delivered her baby girl, Felicia, on 5/21/18 at 39w5d. Pt spouse and FOB is Blade who is present and supportive. Parents have two other children, 10yo daughter and 3yo son.  Intervention: SW has reviewed pt records. SW met with pt this morning to introduce self/role, perform assessment, and discuss resources. SW inquired about pt mental health history, pt shared that she does have a history of PPD and was on wellbutrin in the past. Pt plans to see her therapist (has experience attending therapy at [facility] in [place]) and plans to potentially initiate welbutrin again after she is done breastfeeding, if needed. 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 Pt identifies her postpartum depression on a spectrum based on social/stuational stressors at the time. Pt has insight on the s/s to look for, SW discussed techniques for coping and the importance of family awareness and support. SW normalized \"rollercoaster\" of emotions that may occur following delivery as well as discussed s/s that may be a concern for potential PPD/PPA.  SW also encouraged pt to alert her MD of any concerns at her follow-up appointment. SW discussed PPD/PPA resource folder and provided brief overview of information included. Pt appreciative of the resources, though has established providers in the community and plan in place to manage any s/s of PPD/PPA. Pt feels prepared for discharge and has no additional questions/concerns.  Assessment: Pt pleasant and welcoming of SW involvement. Pt observed to have euthymic affect during conversation. SW allowed space for pt to share thoughts/concerns re: PPD/PPA. SW also inquired about birth experience to which parents shared pt delivered almost immediately upon arrival to the hospital. This has added additional stress however " parents supportive of one another and openly communicate about their feelings. SW provided reflective listening and supportive counseling. Parents are supportive of one another, bonding well with baby, no concerns.  Plan: No further SW follow-up indicated. Pt and baby to discharge today with above mentioned resources. SW provided this writer's contact information if any specific questions arise about the resources provided.    MASON Cárdenas, [facility] CenterSW  Daytime (8:00am-4:30pm): [phone]  After-Hours SW Pager (4:30pm-11:30pm): [phone]

## 2018-05-23 NOTE — PLAN OF CARE
D: VSS, assessments WDL.   I: Pt. received complete discharge paperwork and home medications .  Pt. was given times of last dose for all discharge medications in writing on discharge medication sheets.  Discharge teaching included home medication, pain management, activity restrictions, postpartum cares, and signs and symptoms of infection.    A: Discharge outcomes on care plan met.  Mother states understanding and comfort with self and baby cares.  P: Pt. discharged to home.  Pt. was discharged with baby, and bands were checked at time of discharge.  Pt. was accompanied by , nurse and baby, and left with personal belongings.  Home care ordered  .  Pt. to follow up with OB per MD order.  Pt. had no further questions at the time of discharge and no unmet needs were identified.

## 2025-07-15 NOTE — PLAN OF CARE
Problem: Patient Care Overview  Goal: Plan of Care/Patient Progress Review  Outcome: Adequate for Discharge Date Met: 05/23/18  Doing well,vss,voiding with out difficulty,pain control with tylenol,baby breast feeding well,using lanolin for sore nipple.Plan to discharge home with lovenox.Patient comfortable taking lovenox at home,see  note for PP depression management.       Detail Level: Detailed